# Patient Record
Sex: MALE | Race: WHITE | Employment: FULL TIME | URBAN - METROPOLITAN AREA
[De-identification: names, ages, dates, MRNs, and addresses within clinical notes are randomized per-mention and may not be internally consistent; named-entity substitution may affect disease eponyms.]

---

## 2017-10-27 ENCOUNTER — HOSPITAL ENCOUNTER (OUTPATIENT)
Dept: LAB | Age: 72
Discharge: HOME | End: 2017-10-27

## 2017-10-27 PROCEDURE — 88305 TISSUE EXAM BY PATHOLOGIST: CPT | Performed by: INTERNAL MEDICINE

## 2018-02-09 PROBLEM — E11.21 TYPE 2 DIABETES WITH NEPHROPATHY (HCC): Status: ACTIVE | Noted: 2018-02-09

## 2018-11-16 PROBLEM — Z23 NEED FOR SHINGLES VACCINE: Status: ACTIVE | Noted: 2018-11-16

## 2019-08-10 PROBLEM — R80.9 MICROALBUMINURIA: Status: ACTIVE | Noted: 2019-08-10

## 2019-09-01 PROBLEM — R20.9 BILATERAL COLD FEET: Status: ACTIVE | Noted: 2019-09-01

## 2019-09-01 PROBLEM — R09.89 DIMINISHED PULSES IN LOWER EXTREMITY: Status: ACTIVE | Noted: 2019-09-01

## 2019-09-06 ENCOUNTER — HOSPITAL ENCOUNTER (OUTPATIENT)
Dept: ULTRASOUND IMAGING | Age: 74
Discharge: HOME | End: 2019-09-06
Attending: FAMILY MEDICINE
Payer: MEDICARE

## 2019-09-06 ENCOUNTER — APPOINTMENT (OUTPATIENT)
Dept: ULTRASOUND IMAGING | Age: 74
End: 2019-09-06
Attending: FAMILY MEDICINE
Payer: MEDICARE

## 2019-09-06 DIAGNOSIS — R09.89 DIMINISHED PULSES IN LOWER EXTREMITY: ICD-10-CM

## 2019-09-06 DIAGNOSIS — Z98.890 HISTORY OF AAA (ABDOMINAL AORTIC ANEURYSM) REPAIR: ICD-10-CM

## 2019-09-06 DIAGNOSIS — R60.0 EDEMA OF BOTH LEGS: ICD-10-CM

## 2019-09-06 DIAGNOSIS — Z72.0 TOBACCO ABUSE: ICD-10-CM

## 2019-09-06 PROCEDURE — 93925 LOWER EXTREMITY STUDY: CPT

## 2019-09-07 PROBLEM — I71.43 ANEURYSM OF INFRARENAL ABDOMINAL AORTA: Status: ACTIVE | Noted: 2019-09-07

## 2019-09-07 NOTE — PROGRESS NOTES
NEVER MIND. I just realized he HAD surgery by Dr. Rory Middleton for that a couple years ago. Does he follow with vascular on that issue at all now? And if so who is his surgeon?

## 2019-09-07 NOTE — PROGRESS NOTES
Blood flow to lower extremities is good, but there is significant widening of aorta. I would like to get a consult with a vascular surgeon to evaluate and if nothing else, to monitor in case it gets larger and requires some surgical intervention.

## 2019-09-09 NOTE — PROGRESS NOTES
Patient notified, he sees Toney Hadley at [facility] and has US every year. He asks why are his legs swelling?

## 2019-09-23 PROBLEM — Z23 NEED FOR SHINGLES VACCINE: Status: RESOLVED | Noted: 2018-11-16 | Resolved: 2019-09-23

## 2020-02-07 PROBLEM — Z79.899 HIGH RISK MEDICATION USE: Status: ACTIVE | Noted: 2020-02-07

## 2020-02-07 PROBLEM — N18.30 TYPE 2 DIABETES MELLITUS WITH STAGE 3 CHRONIC KIDNEY DISEASE, WITHOUT LONG-TERM CURRENT USE OF INSULIN (HCC): Status: ACTIVE | Noted: 2020-02-07

## 2020-02-07 PROBLEM — E11.22 TYPE 2 DIABETES MELLITUS WITH STAGE 3 CHRONIC KIDNEY DISEASE, WITHOUT LONG-TERM CURRENT USE OF INSULIN (HCC): Status: ACTIVE | Noted: 2020-02-07

## 2020-02-07 PROBLEM — Z23 NEED FOR DIPHTHERIA-TETANUS-PERTUSSIS (TDAP) VACCINE: Status: ACTIVE | Noted: 2020-02-07

## 2020-03-08 PROBLEM — Z23 NEED FOR SHINGLES VACCINE: Status: RESOLVED | Noted: 2018-11-16 | Resolved: 2020-03-08

## 2020-08-09 PROBLEM — R20.2 NUMBNESS AND TINGLING IN LEFT ARM: Status: ACTIVE | Noted: 2020-08-09

## 2020-08-09 PROBLEM — E11.51 TYPE 2 DIABETES MELLITUS WITH DIABETIC PERIPHERAL ANGIOPATHY WITHOUT GANGRENE, WITHOUT LONG-TERM CURRENT USE OF INSULIN (HCC): Status: ACTIVE | Noted: 2020-08-09

## 2020-08-09 PROBLEM — M77.50 ENTHESOPATHY OF ANKLE AND TARSUS: Status: ACTIVE | Noted: 2020-08-09

## 2020-08-09 PROBLEM — H61.23 BILATERAL IMPACTED CERUMEN: Status: ACTIVE | Noted: 2020-08-09

## 2020-08-09 PROBLEM — R20.0 NUMBNESS AND TINGLING IN LEFT ARM: Status: ACTIVE | Noted: 2020-08-09

## 2020-08-13 ENCOUNTER — HOSPITAL ENCOUNTER (OUTPATIENT)
Dept: ULTRASOUND IMAGING | Age: 75
Discharge: HOME | End: 2020-08-13
Attending: FAMILY MEDICINE
Payer: MEDICARE

## 2020-08-13 DIAGNOSIS — R20.2 NUMBNESS AND TINGLING IN LEFT ARM: ICD-10-CM

## 2020-08-13 DIAGNOSIS — R20.0 NUMBNESS AND TINGLING IN LEFT ARM: ICD-10-CM

## 2020-08-13 PROCEDURE — 93880 EXTRACRANIAL BILAT STUDY: CPT

## 2020-08-25 NOTE — PROGRESS NOTES
50% blockage is not considered to be significant (usually not until like 90%) and would be unlikely to cause any symptoms. Good report. Any more episodes like he was describing?

## 2020-12-17 ENCOUNTER — HOSPITAL ENCOUNTER (OUTPATIENT)
Dept: LAB | Age: 75
Discharge: HOME | End: 2020-12-17

## 2020-12-17 PROCEDURE — 88305 TISSUE EXAM BY PATHOLOGIST: CPT

## 2022-03-18 PROBLEM — R20.0 NUMBNESS AND TINGLING IN LEFT ARM: Status: ACTIVE | Noted: 2020-08-09

## 2022-03-18 PROBLEM — R20.9 BILATERAL COLD FEET: Status: ACTIVE | Noted: 2019-09-01

## 2022-03-18 PROBLEM — M77.50 ENTHESOPATHY OF ANKLE AND TARSUS: Status: ACTIVE | Noted: 2020-08-09

## 2022-03-18 PROBLEM — R20.2 NUMBNESS AND TINGLING IN LEFT ARM: Status: ACTIVE | Noted: 2020-08-09

## 2022-03-18 PROBLEM — E11.22 TYPE 2 DIABETES MELLITUS WITH STAGE 3 CHRONIC KIDNEY DISEASE, WITHOUT LONG-TERM CURRENT USE OF INSULIN (HCC): Status: ACTIVE | Noted: 2020-02-07

## 2022-03-18 PROBLEM — N18.30 TYPE 2 DIABETES MELLITUS WITH STAGE 3 CHRONIC KIDNEY DISEASE, WITHOUT LONG-TERM CURRENT USE OF INSULIN (HCC): Status: ACTIVE | Noted: 2020-02-07

## 2022-03-19 PROBLEM — H61.23 BILATERAL IMPACTED CERUMEN: Status: ACTIVE | Noted: 2020-08-09

## 2022-03-19 PROBLEM — I71.43 ANEURYSM OF INFRARENAL ABDOMINAL AORTA (HCC): Status: ACTIVE | Noted: 2019-09-07

## 2022-03-19 PROBLEM — R80.9 MICROALBUMINURIA: Status: ACTIVE | Noted: 2019-08-10

## 2022-03-19 PROBLEM — E11.51 TYPE 2 DIABETES MELLITUS WITH DIABETIC PERIPHERAL ANGIOPATHY WITHOUT GANGRENE, WITHOUT LONG-TERM CURRENT USE OF INSULIN (HCC): Status: ACTIVE | Noted: 2020-08-09

## 2022-03-19 PROBLEM — Z79.899 HIGH RISK MEDICATION USE: Status: ACTIVE | Noted: 2020-02-07

## 2022-03-20 PROBLEM — R09.89 DIMINISHED PULSES IN LOWER EXTREMITY: Status: ACTIVE | Noted: 2019-09-01

## 2022-03-30 PROBLEM — I65.23 CAROTID STENOSIS, ASYMPTOMATIC, BILATERAL: Status: ACTIVE | Noted: 2022-03-30

## 2022-03-30 PROBLEM — I71.40 AAA (ABDOMINAL AORTIC ANEURYSM): Status: ACTIVE | Noted: 2019-09-07

## 2022-03-30 PROBLEM — I71.40 AAA (ABDOMINAL AORTIC ANEURYSM) (HCC): Status: ACTIVE | Noted: 2019-09-07

## 2022-10-03 ENCOUNTER — OFFICE VISIT (OUTPATIENT)
Dept: VASCULAR SURGERY | Age: 77
End: 2022-10-03
Payer: MEDICARE

## 2022-10-03 VITALS
OXYGEN SATURATION: 98 % | HEIGHT: 70 IN | TEMPERATURE: 97.9 F | HEART RATE: 84 BPM | SYSTOLIC BLOOD PRESSURE: 126 MMHG | DIASTOLIC BLOOD PRESSURE: 81 MMHG | BODY MASS INDEX: 26.05 KG/M2 | WEIGHT: 182 LBS

## 2022-10-03 DIAGNOSIS — I71.43 INFRARENAL ABDOMINAL AORTIC ANEURYSM (AAA) WITHOUT RUPTURE: Primary | ICD-10-CM

## 2022-10-03 DIAGNOSIS — Z98.890 HISTORY OF AAA (ABDOMINAL AORTIC ANEURYSM) REPAIR: ICD-10-CM

## 2022-10-03 PROCEDURE — 99213 OFFICE O/P EST LOW 20 MIN: CPT | Performed by: NURSE PRACTITIONER

## 2022-10-03 PROCEDURE — G8417 CALC BMI ABV UP PARAM F/U: HCPCS | Performed by: NURSE PRACTITIONER

## 2022-10-03 PROCEDURE — G8484 FLU IMMUNIZE NO ADMIN: HCPCS | Performed by: NURSE PRACTITIONER

## 2022-10-03 PROCEDURE — G8427 DOCREV CUR MEDS BY ELIG CLIN: HCPCS | Performed by: NURSE PRACTITIONER

## 2022-10-03 PROCEDURE — 4004F PT TOBACCO SCREEN RCVD TLK: CPT | Performed by: NURSE PRACTITIONER

## 2022-10-03 PROCEDURE — 1123F ACP DISCUSS/DSCN MKR DOCD: CPT | Performed by: NURSE PRACTITIONER

## 2022-10-03 ASSESSMENT — PATIENT HEALTH QUESTIONNAIRE - PHQ9
SUM OF ALL RESPONSES TO PHQ QUESTIONS 1-9: 0
SUM OF ALL RESPONSES TO PHQ9 QUESTIONS 1 & 2: 0
2. FEELING DOWN, DEPRESSED OR HOPELESS: 0
SUM OF ALL RESPONSES TO PHQ QUESTIONS 1-9: 0
SUM OF ALL RESPONSES TO PHQ QUESTIONS 1-9: 0
1. LITTLE INTEREST OR PLEASURE IN DOING THINGS: 0
SUM OF ALL RESPONSES TO PHQ QUESTIONS 1-9: 0

## 2022-10-03 NOTE — PROGRESS NOTES
DATE OF VISIT: 10/3/2022      [facility]  Vita Allen is a 65 y.o. male is here in follow up for his 6-month aorta duplex study. He denies any new abdominal pain or back pain. He has undergone aneurysm repair in 2015. Unfortunately he continues to smoke and has no desire to stop. He remains on aspirin and cholesterol-lowering medication. MEDICAL HISTORY:   Past Medical History:   Diagnosis Date    Chronic kidney disease     Constipation     Diabetes (HCC)     borderline    GERD (gastroesophageal reflux disease)     managed with medication     Hypercholesterolemia     managed with medication     Hypertension     managed with medication     Osteoarthritis     thumbs    PUD (peptic ulcer disease)      no recent symptoms         SURGICAL HISTORY:   Past Surgical History:   Procedure Laterality Date    ABDOMINAL AORTIC ANEURYSM REPAIR  11/23/15    Dr Jorge Cruz Left     CATARACT REMOVAL Right     COLONOSCOPY  7/11/14    SHOULDER ARTHROSCOPY Right     TONSILLECTOMY      TONSILLECTOMY AND ADENOIDECTOMY         Review of Systems:  Constitutional:   Negative for fevers and unexplained weight loss. Respiratory:   Negative for hemoptysis. Cardiovascular:   Negative except as noted in HPI. Musculoskeletal:  Negative for active, unexplained/severe joint pain. ALLERGIES:   Allergies   Allergen Reactions    Penicillins Anaphylaxis    Varenicline Other (See Comments)     insomnia       CURRENT MEDICATIONS:  Current Outpatient Medications   Medication Sig Dispense Refill    aspirin 81 MG EC tablet Take 81 mg by mouth daily      atorvastatin (LIPITOR) 80 MG tablet Take 1 tablet by mouth once daily      lisinopril (PRINIVIL;ZESTRIL) 20 MG tablet Take 1 tablet by mouth once daily       No current facility-administered medications for this visit. IMAGING: Interpretation Summary         The aorta and graft appear patent post endovascular aortic aneurysm repair.  No evidence of endoleak. Excluded abdominal aortic sac, maximum diameter 3.5cm x 3.6cm. The right common iliac artery appears patent and has a maximum diameter measuring 1.4cm AP x 1.4cm transverse. The left common iliac artery appears patent and has a maximum diameter measuring 1.8cm AP x 1.7cm transverse. Procedure Details    A gray scale, color Doppler imaging, spectral Doppler analysis and B-mode ultrasound was performed. During the study longitudinal and transverse views were obtained. Pulsed wave doppler was performed. Overall the study quality was good. Abdominal Findings    Abdominal Aorta    Infrarenal abdominal aortic aneurysm (AAA) was visualized.      Abdominal Aorta Measurements     PSV AP TR   Prox Ao 43.1 cm/s        2.29 cm        2.2 cm          Mid Ao 43.1 cm/s        2.66 cm        2.37 cm          Dist Ao     3.52 cm        3.63 cm          Suprarenal Ao 38.5 cm/s            Infrarenal Ao 0 cm/s              EVAR Measurements     PSV Right PSV Left PSV   Superior Attach 35.4 cm/s            Graft Body 37 cm/s            Prox Limb  40.8 cm/s        47 cm/s          Mid Limb  45.5 cm/s        47.8 cm/s          Dist Limb  57 cm/s        44.7 cm/s          Distal Attach  58.5 cm/s        47 cm/s          Outflow  73.2 cm/s        46.2 cm/s            Iliac Artery Measurements     PSV AP TR   Rt PO     1.4 cm        1.4 cm          Rt PO Mid 39.3 cm/s            Lt PO     1.8 cm        1.7 cm          Lt PO Mid 46.2 cm/s                                      Procedure Staff    Technologist/Clinician: Ciaran Snell  Supporting Staff: None  Performing Physician/Midlevel: None     Exam Completion Date/Time: 10/3/22  9:36 AM      PHYSICAL EXAM  VITALS: /81 (Site: Left Upper Arm, Position: Sitting, Cuff Size: Medium Adult)   Pulse 84   Temp 97.9 °F (36.6 °C) (Temporal)   Ht 5' 10\" (1.778 m)   Wt 182 lb (82.6 kg)   SpO2 98%   BMI 26.11 kg/m²       GENERAL: Well developed, well nourished, in NAD  HEAD/NECK: normocephalic, atraumatic, neck supple  ABDOMEN: soft, nontender, nondistended  MUSCULOSKELETAL: normal gait  NEURO: sensation and strength grossly intact and symmetrical  PSYCH: alert and oriented to person, place and time      Assessment/Plan: Patient is a 79-year-old male who follows up for 6-month aorta duplex study. No new abdominal pain or back pain. He has undergone aneurysm repair in 2015. No endoleak is noted on the ultrasound today. We will continue to follow him with 6-month duplex studies. Continue aspirin and cholesterol-lowering medication. Have counseled him on smoking cessation however he has no desire to stop. Return in 6 months sooner should any new abdominal pain or back pain arise. Will also repeat a carotid duplex study in 6 months. If duplex studies are stable in 6 months, will push out to yearly surveillance.          Sommer Galeana, APRN - CNP    20 minutes of time was spent on this encounter including chart review, assessment and evaluation

## 2023-04-03 ENCOUNTER — OFFICE VISIT (OUTPATIENT)
Dept: VASCULAR SURGERY | Age: 78
End: 2023-04-03
Payer: MEDICARE

## 2023-04-03 VITALS
BODY MASS INDEX: 25.62 KG/M2 | SYSTOLIC BLOOD PRESSURE: 150 MMHG | OXYGEN SATURATION: 97 % | HEIGHT: 70 IN | HEART RATE: 74 BPM | DIASTOLIC BLOOD PRESSURE: 88 MMHG | WEIGHT: 179 LBS

## 2023-04-03 DIAGNOSIS — I71.43 INFRARENAL ABDOMINAL AORTIC ANEURYSM (AAA) WITHOUT RUPTURE (HCC): Primary | ICD-10-CM

## 2023-04-03 DIAGNOSIS — Z72.0 TOBACCO ABUSE: ICD-10-CM

## 2023-04-03 DIAGNOSIS — I65.23 CAROTID STENOSIS, ASYMPTOMATIC, BILATERAL: ICD-10-CM

## 2023-04-03 DIAGNOSIS — Z98.890 HISTORY OF AAA (ABDOMINAL AORTIC ANEURYSM) REPAIR: ICD-10-CM

## 2023-04-03 PROCEDURE — 4004F PT TOBACCO SCREEN RCVD TLK: CPT | Performed by: NURSE PRACTITIONER

## 2023-04-03 PROCEDURE — 3077F SYST BP >= 140 MM HG: CPT | Performed by: NURSE PRACTITIONER

## 2023-04-03 PROCEDURE — 3079F DIAST BP 80-89 MM HG: CPT | Performed by: NURSE PRACTITIONER

## 2023-04-03 PROCEDURE — G8417 CALC BMI ABV UP PARAM F/U: HCPCS | Performed by: NURSE PRACTITIONER

## 2023-04-03 PROCEDURE — G8427 DOCREV CUR MEDS BY ELIG CLIN: HCPCS | Performed by: NURSE PRACTITIONER

## 2023-04-03 PROCEDURE — 99213 OFFICE O/P EST LOW 20 MIN: CPT | Performed by: NURSE PRACTITIONER

## 2023-04-03 PROCEDURE — 1123F ACP DISCUSS/DSCN MKR DOCD: CPT | Performed by: NURSE PRACTITIONER

## 2023-04-03 NOTE — PROGRESS NOTES
internal carotid artery. Heterogeneous plaque in the right internal carotid artery. Mild (<50%) stenosis in the left internal carotid artery. Heterogeneous plaque in the left internal carotid artery. Normal antegrade flow involving the right vertebral artery. Normal antegrade flow involving the left vertebral artery. Procedure Details    A gray scale, color Doppler imaging and spectral Doppler analysis ultrasound was performed. During the study longitudinal views were obtained. Pulsed wave doppler was performed. Overall the study quality was good. Cerebrovascular Findings    Right Carotid    Right arm BP: 150 mmHg. Internal Carotid Artery: Mild (<50%) stenosis. Heterogeneous plaque. Vertebral Artery: Flow is antegrade. Left Carotid    Left arm BP: 147 mmHg. Internal Carotid Artery: Mild (<50%) stenosis. Heterogeneous plaque. Vertebral Artery: Flow is antegrade. Carotid Measurements     Right PSV Right EDV Left PSV Left EDV   CCA Prox 83.6 cm/s        14.3 cm/s        99.9 cm/s        16.3 cm/s          CCA Dist 77.5 cm/s        14.9 cm/s        85.6 cm/s        18.3 cm/s          ICA Prox 64.6 cm/s        19 cm/s        82.2 cm/s        17.7 cm/s          ICA Mid 94.4 cm/s        22.4 cm/s        87 cm/s        27.2 cm/s          ICA Dist 97.2 cm/s        24.5 cm/s        61.8 cm/s        14.3 cm/s          ICA/CCA Ratio 1.3         1           ECA 63.2 cm/s        0 cm/s        51 cm/s        3.4 cm/s          Vertebral 54.4 cm/s        12.9 cm/s        88.3 cm/s        16.3 cm/s            Arterial Pressure Measurements     Right Left   Brachial  mmHg        147 mmHg                                    Procedure Staff    Technologist/Clinician: Taina Caldwell  Supporting Staff: None  Performing Physician/Midlevel: None     Exam Completion Date/Time:      Interpretation Summary         The aorta and graft appear patent post endovascular aortic aneurysm repair.   No

## 2024-04-03 ENCOUNTER — OFFICE VISIT (OUTPATIENT)
Dept: VASCULAR SURGERY | Age: 79
End: 2024-04-03
Payer: MEDICARE

## 2024-04-03 VITALS
SYSTOLIC BLOOD PRESSURE: 155 MMHG | HEART RATE: 69 BPM | HEIGHT: 70 IN | WEIGHT: 185 LBS | DIASTOLIC BLOOD PRESSURE: 84 MMHG | OXYGEN SATURATION: 96 % | BODY MASS INDEX: 26.48 KG/M2

## 2024-04-03 DIAGNOSIS — Z98.890 S/P AORTA REPAIR: ICD-10-CM

## 2024-04-03 DIAGNOSIS — Z72.0 TOBACCO ABUSE: ICD-10-CM

## 2024-04-03 DIAGNOSIS — I65.23 BILATERAL CAROTID ARTERY STENOSIS: Primary | ICD-10-CM

## 2024-04-03 DIAGNOSIS — Z98.890 HISTORY OF AAA (ABDOMINAL AORTIC ANEURYSM) REPAIR: ICD-10-CM

## 2024-04-03 PROCEDURE — 4004F PT TOBACCO SCREEN RCVD TLK: CPT | Performed by: NURSE PRACTITIONER

## 2024-04-03 PROCEDURE — 1123F ACP DISCUSS/DSCN MKR DOCD: CPT | Performed by: NURSE PRACTITIONER

## 2024-04-03 PROCEDURE — 3077F SYST BP >= 140 MM HG: CPT | Performed by: NURSE PRACTITIONER

## 2024-04-03 PROCEDURE — 99214 OFFICE O/P EST MOD 30 MIN: CPT | Performed by: NURSE PRACTITIONER

## 2024-04-03 PROCEDURE — 3079F DIAST BP 80-89 MM HG: CPT | Performed by: NURSE PRACTITIONER

## 2024-04-03 PROCEDURE — G8427 DOCREV CUR MEDS BY ELIG CLIN: HCPCS | Performed by: NURSE PRACTITIONER

## 2024-04-03 PROCEDURE — G8417 CALC BMI ABV UP PARAM F/U: HCPCS | Performed by: NURSE PRACTITIONER

## 2024-04-03 NOTE — PROGRESS NOTES
study performed and he has no significant carotid disease. Continue prescribed medications. I do recommend a daily ASA.  I have counseled him on smoking cessation however he has no desire to stop.   Return in 1 year or sooner should any new abdominal pain or back pain arise. He does not want to come annually, he wants to come every 2 years. I have again recommended yearly, however, I'm unsure that he will schedule a follow-up in 1 year.  He is to present to the ER with any S/S of a stroke ie: slurred speech, facial drooping, amaurosis fugax or unilateral weakness abdominal pain or back pain in the interim.        Sommer Galeana, APRN - CNP    30 minutes of time was spent on this encounter including chart review, assessment and evaluation

## 2025-04-09 ENCOUNTER — OFFICE VISIT (OUTPATIENT)
Dept: VASCULAR SURGERY | Age: 80
End: 2025-04-09
Payer: MEDICARE

## 2025-04-09 VITALS
BODY MASS INDEX: 26.26 KG/M2 | SYSTOLIC BLOOD PRESSURE: 130 MMHG | DIASTOLIC BLOOD PRESSURE: 75 MMHG | HEART RATE: 87 BPM | WEIGHT: 183 LBS | OXYGEN SATURATION: 81 %

## 2025-04-09 DIAGNOSIS — I71.40 ABDOMINAL AORTIC ANEURYSM (AAA) WITHOUT RUPTURE, UNSPECIFIED PART: Primary | ICD-10-CM

## 2025-04-09 DIAGNOSIS — Z72.0 TOBACCO ABUSE: ICD-10-CM

## 2025-04-09 DIAGNOSIS — I65.23 BILATERAL CAROTID ARTERY STENOSIS: ICD-10-CM

## 2025-04-09 DIAGNOSIS — Z98.890 S/P AORTA REPAIR: ICD-10-CM

## 2025-04-09 PROCEDURE — G2211 COMPLEX E/M VISIT ADD ON: HCPCS | Performed by: NURSE PRACTITIONER

## 2025-04-09 PROCEDURE — 1123F ACP DISCUSS/DSCN MKR DOCD: CPT | Performed by: NURSE PRACTITIONER

## 2025-04-09 PROCEDURE — G8427 DOCREV CUR MEDS BY ELIG CLIN: HCPCS | Performed by: NURSE PRACTITIONER

## 2025-04-09 PROCEDURE — 3075F SYST BP GE 130 - 139MM HG: CPT | Performed by: NURSE PRACTITIONER

## 2025-04-09 PROCEDURE — 1160F RVW MEDS BY RX/DR IN RCRD: CPT | Performed by: NURSE PRACTITIONER

## 2025-04-09 PROCEDURE — 4004F PT TOBACCO SCREEN RCVD TLK: CPT | Performed by: NURSE PRACTITIONER

## 2025-04-09 PROCEDURE — 99214 OFFICE O/P EST MOD 30 MIN: CPT | Performed by: NURSE PRACTITIONER

## 2025-04-09 PROCEDURE — 1159F MED LIST DOCD IN RCRD: CPT | Performed by: NURSE PRACTITIONER

## 2025-04-09 PROCEDURE — 3078F DIAST BP <80 MM HG: CPT | Performed by: NURSE PRACTITIONER

## 2025-04-09 PROCEDURE — G8419 CALC BMI OUT NRM PARAM NOF/U: HCPCS | Performed by: NURSE PRACTITIONER

## 2025-04-09 RX ORDER — CHOLECALCIFEROL (VITAMIN D3) 1250 MCG
CAPSULE ORAL WEEKLY
COMMUNITY

## 2025-04-09 NOTE — PROGRESS NOTES
significant carotid disease. Continue prescribed medications. I do recommend a daily ASA.  I have counseled him on smoking cessation however he has no desire to stop.   Return in 2 years or sooner should any new abdominal pain or back pain arise. He is to present to the ER with any S/S of a stroke ie: slurred speech, facial drooping, amaurosis fugax or unilateral weakness abdominal pain or back pain in the interim.         Sommer Galeana, APRN - CNP    30 minutes of time was spent on this encounter including chart review, assessment and evaluation